# Patient Record
Sex: FEMALE | URBAN - METROPOLITAN AREA
[De-identification: names, ages, dates, MRNs, and addresses within clinical notes are randomized per-mention and may not be internally consistent; named-entity substitution may affect disease eponyms.]

---

## 2023-08-15 ENCOUNTER — OFFICE VISIT (OUTPATIENT)
Dept: URBAN - METROPOLITAN AREA CLINIC 98 | Facility: CLINIC | Age: 58
End: 2023-08-15
Payer: OTHER GOVERNMENT

## 2023-08-15 DIAGNOSIS — K92.89 GAS BLOAT SYNDROME: ICD-10-CM

## 2023-08-15 DIAGNOSIS — Z12.11 COLON CANCER SCREENING: ICD-10-CM

## 2023-08-15 PROBLEM — 119292006: Status: ACTIVE | Noted: 2023-08-15

## 2023-08-15 PROCEDURE — 99203 OFFICE O/P NEW LOW 30 MIN: CPT | Performed by: INTERNAL MEDICINE

## 2023-08-15 RX ORDER — BUSPIRONE HYDROCHLORIDE 5 MG/1
TAKE ONE TABLET BY MOUTH TWICE A DAY IN THE MORNING AND BEFORE BEDTIME TABLET ORAL
Qty: 60 UNSPECIFIED | Refills: 0 | Status: ACTIVE | COMMUNITY

## 2023-08-15 RX ORDER — CLONAZEPAM 0.5 MG/1
TAKE ONE TABLET BY MOUTH FOUR TIMES A DAY ( IN THE MORNING, AFTERNOON, EVENING, AND AT BEDTIME) TABLET ORAL
Qty: 120 UNSPECIFIED | Refills: 3 | Status: ACTIVE | COMMUNITY

## 2023-08-15 RX ORDER — MIRTAZAPINE 15 MG/1
1 TABLET AT BEDTIME TABLET, FILM COATED ORAL ONCE A DAY
Qty: 90 TABLET | Refills: 3 | OUTPATIENT
Start: 2023-08-15

## 2023-08-15 RX ORDER — ALPRAZOLAM 1 MG/1
TAKE ONE TABLET BY MOUTH ONE TIME DAILY AS NEEDED FOR ANXIETY TABLET ORAL
Qty: 15 UNSPECIFIED | Refills: 2 | Status: ACTIVE | COMMUNITY

## 2023-08-15 NOTE — HPI-TODAY'S VISIT:
From [facility]   passed away 2020 and son developed epilepsy 2021 Took Magnesium OTC, and developed bloating and gas after about 3mo Worsened over time and she went to GYN She realized that magnesium may the cause and stopped.  This led to improved symptoms. No weight loss

## 2023-11-15 ENCOUNTER — OFFICE VISIT (OUTPATIENT)
Dept: URBAN - METROPOLITAN AREA TELEHEALTH 2 | Facility: TELEHEALTH | Age: 58
End: 2023-11-15

## 2024-04-15 ENCOUNTER — OV EP (OUTPATIENT)
Dept: URBAN - METROPOLITAN AREA CLINIC 98 | Facility: CLINIC | Age: 59
End: 2024-04-15
Payer: OTHER GOVERNMENT

## 2024-04-15 VITALS
BODY MASS INDEX: 25.73 KG/M2 | DIASTOLIC BLOOD PRESSURE: 50 MMHG | HEART RATE: 51 BPM | HEIGHT: 72 IN | TEMPERATURE: 97 F | SYSTOLIC BLOOD PRESSURE: 130 MMHG | WEIGHT: 190 LBS

## 2024-04-15 DIAGNOSIS — K92.89 GAS BLOAT SYNDROME: ICD-10-CM

## 2024-04-15 DIAGNOSIS — K21.9 GASTROESOPHAGEAL REFLUX DISEASE, UNSPECIFIED WHETHER ESOPHAGITIS PRESENT: ICD-10-CM

## 2024-04-15 DIAGNOSIS — Z12.11 COLON CANCER SCREENING: ICD-10-CM

## 2024-04-15 DIAGNOSIS — K64.8 HEMORRHOIDS, INTERNAL: ICD-10-CM

## 2024-04-15 PROCEDURE — 99214 OFFICE O/P EST MOD 30 MIN: CPT | Performed by: INTERNAL MEDICINE

## 2024-04-15 RX ORDER — ALPRAZOLAM 1 MG/1
TAKE ONE TABLET BY MOUTH ONE TIME DAILY AS NEEDED FOR ANXIETY TABLET ORAL
Qty: 15 UNSPECIFIED | Refills: 2 | Status: ACTIVE | COMMUNITY

## 2024-04-15 RX ORDER — MIRTAZAPINE 15 MG/1
1 TABLET AT BEDTIME TABLET, FILM COATED ORAL ONCE A DAY
Qty: 90 TABLET | Refills: 3 | Status: ON HOLD | COMMUNITY
Start: 2023-08-15

## 2024-04-15 RX ORDER — PANTOPRAZOLE SODIUM 40 MG/1
1 TABLET TABLET, DELAYED RELEASE ORAL ONCE A DAY
Qty: 90 TABLET | Refills: 0 | OUTPATIENT
Start: 2024-04-15

## 2024-04-15 RX ORDER — WHEAT DEXTRIN 3 G/3.5 G
AS DIRECTED POWDER (GRAM) ORAL
OUTPATIENT

## 2024-04-15 RX ORDER — BUSPIRONE HYDROCHLORIDE 5 MG/1
TAKE ONE TABLET BY MOUTH TWICE A DAY IN THE MORNING AND BEFORE BEDTIME TABLET ORAL
Qty: 60 UNSPECIFIED | Refills: 0 | Status: ON HOLD | COMMUNITY

## 2024-04-15 RX ORDER — CLONAZEPAM 0.5 MG/1
TAKE ONE TABLET BY MOUTH FOUR TIMES A DAY ( IN THE MORNING, AFTERNOON, EVENING, AND AT BEDTIME) TABLET ORAL
Qty: 120 UNSPECIFIED | Refills: 3 | Status: ACTIVE | COMMUNITY

## 2024-04-15 NOTE — HPI-TODAY'S VISIT:
f/u visit After last visit, I ordered Remeron, but she has not started it About a month ago she woke up with acid reflux Began in setting of stress with work Began OTC Omeprazole BID without much improvement She got Protonix 40mg once daily from PCP, and symptoms have improved  . PRIOR VISIT: From [facility]   passed away 2020 and son developed epilepsy 2021 Took Magnesium OTC, and developed bloating and gas after about 3mo Worsened over time and she went to GYN She realized that magnesium may the cause and stopped.  This led to improved symptoms. No weight loss